# Patient Record
Sex: MALE | Race: WHITE
[De-identification: names, ages, dates, MRNs, and addresses within clinical notes are randomized per-mention and may not be internally consistent; named-entity substitution may affect disease eponyms.]

---

## 2017-06-26 ENCOUNTER — HOSPITAL ENCOUNTER (OUTPATIENT)
Dept: HOSPITAL 45 - C.LABPBG | Age: 42
Discharge: HOME | End: 2017-06-26
Attending: INTERNAL MEDICINE
Payer: COMMERCIAL

## 2017-06-26 DIAGNOSIS — R73.03: ICD-10-CM

## 2017-06-26 DIAGNOSIS — R74.0: Primary | ICD-10-CM

## 2017-06-26 DIAGNOSIS — I10: ICD-10-CM

## 2017-06-26 LAB
ALBUMIN/GLOB SERPL: 1.2 {RATIO} (ref 0.9–2)
ALP SERPL-CCNC: 46 U/L (ref 45–117)
ALT SERPL-CCNC: 96 U/L (ref 12–78)
ANION GAP SERPL CALC-SCNC: 8 MMOL/L (ref 3–11)
AST SERPL-CCNC: 65 U/L (ref 15–37)
BUN SERPL-MCNC: 18 MG/DL (ref 7–18)
BUN/CREAT SERPL: 16 (ref 10–20)
CALCIUM SERPL-MCNC: 10.2 MG/DL (ref 8.5–10.1)
CHLORIDE SERPL-SCNC: 103 MMOL/L (ref 98–107)
CO2 SERPL-SCNC: 30 MMOL/L (ref 21–32)
CREAT SERPL-MCNC: 1.1 MG/DL (ref 0.6–1.4)
EST. AVERAGE GLUCOSE BLD GHB EST-MCNC: 134 MG/DL
FERRITIN SERPL-MCNC: 428.1 NG/ML (ref 8–388)
GLOBULIN SER-MCNC: 3.6 GM/DL (ref 2.5–4)
GLUCOSE SERPL-MCNC: 112 MG/DL (ref 70–99)
POTASSIUM SERPL-SCNC: 3.4 MMOL/L (ref 3.5–5.1)
SODIUM SERPL-SCNC: 141 MMOL/L (ref 136–145)
TIBC SERPL-MCNC: 342 MCG/DL (ref 250–450)

## 2017-07-05 NOTE — CODING QUERY MEDICAL NECESSITY
CQSUPPORTING DIAGNOSIS NEEDED





A supporting diagnosis is required for the test/procedure performed on this patient in 
order for us to be reimbursed by the patient's insurance. Please provide a supporting 
diagnosis for the following test/procedure listed below next to the test name along with 
your signature. 



*If there is no additional diagnosis for this patient that would support the following 
test/procedure please document that below next to the test/procedure.



Test(s)/Procedure(s) that require a supporting diagnosis:



DOS   06/26/17      GLYCATED HEMOGLOBIN TEST







Provider Signature:  ______________________________  Date:  _______



Thank you  

Karen Pineda

Health Information Management

Phone:  [phone]

Fax:  [phone]



Once completed, please kindly fax back to [phone]



For questions please call [phone]

## 2017-07-31 ENCOUNTER — HOSPITAL ENCOUNTER (OUTPATIENT)
Dept: HOSPITAL 45 - C.LABPBG | Age: 42
Discharge: HOME | End: 2017-07-31
Attending: FAMILY MEDICINE
Payer: COMMERCIAL

## 2017-07-31 DIAGNOSIS — E78.1: Primary | ICD-10-CM

## 2017-07-31 LAB
ALP SERPL-CCNC: 34 U/L (ref 45–117)
ALT SERPL-CCNC: 87 U/L (ref 12–78)
AST SERPL-CCNC: 47 U/L (ref 15–37)

## 2017-09-26 ENCOUNTER — HOSPITAL ENCOUNTER (OUTPATIENT)
Dept: HOSPITAL 45 - C.LABPBG | Age: 42
Discharge: HOME | End: 2017-09-26
Attending: FAMILY MEDICINE
Payer: COMMERCIAL

## 2017-09-26 DIAGNOSIS — R73.03: ICD-10-CM

## 2017-09-26 DIAGNOSIS — E78.1: Primary | ICD-10-CM

## 2017-09-26 DIAGNOSIS — R74.8: ICD-10-CM

## 2017-09-26 LAB
ALBUMIN/GLOB SERPL: 1.2 {RATIO} (ref 0.9–2)
ALP SERPL-CCNC: 31 U/L (ref 45–117)
ALT SERPL-CCNC: 56 U/L (ref 12–78)
ANION GAP SERPL CALC-SCNC: 8 MMOL/L (ref 3–11)
AST SERPL-CCNC: 35 U/L (ref 15–37)
BUN SERPL-MCNC: 21 MG/DL (ref 7–18)
BUN/CREAT SERPL: 15.3 (ref 10–20)
CALCIUM SERPL-MCNC: 9.9 MG/DL (ref 8.5–10.1)
CHLORIDE SERPL-SCNC: 102 MMOL/L (ref 98–107)
CHOLEST/HDLC SERPL: 4.1 {RATIO}
CO2 SERPL-SCNC: 29 MMOL/L (ref 21–32)
CREAT SERPL-MCNC: 1.4 MG/DL (ref 0.6–1.4)
EST. AVERAGE GLUCOSE BLD GHB EST-MCNC: 134 MG/DL
GLOBULIN SER-MCNC: 3.6 GM/DL (ref 2.5–4)
GLUCOSE SERPL-MCNC: 93 MG/DL (ref 70–99)
GLUCOSE UR QL: 36 MG/DL
KETONES UR QL STRIP: 89 MG/DL
NITRITE UR QL STRIP: 119 MG/DL (ref 0–150)
PH UR: 149 MG/DL (ref 0–200)
POTASSIUM SERPL-SCNC: 3.7 MMOL/L (ref 3.5–5.1)
SODIUM SERPL-SCNC: 139 MMOL/L (ref 136–145)
VERY LOW DENSITY LIPOPROT CALC: 24 MG/DL

## 2018-03-23 ENCOUNTER — HOSPITAL ENCOUNTER (OUTPATIENT)
Dept: HOSPITAL 45 - C.LABPBG | Age: 43
Discharge: HOME | End: 2018-03-23
Attending: FAMILY MEDICINE
Payer: COMMERCIAL

## 2018-03-23 DIAGNOSIS — Q24.9: Primary | ICD-10-CM

## 2018-03-23 DIAGNOSIS — E78.1: ICD-10-CM

## 2018-03-23 DIAGNOSIS — R74.8: ICD-10-CM

## 2018-03-23 DIAGNOSIS — R73.03: ICD-10-CM

## 2018-03-23 LAB
ALBUMIN SERPL-MCNC: 4.5 GM/DL (ref 3.4–5)
ALP SERPL-CCNC: 29 U/L (ref 45–117)
ALT SERPL-CCNC: 58 U/L (ref 12–78)
AST SERPL-CCNC: 38 U/L (ref 15–37)
BUN SERPL-MCNC: 24 MG/DL (ref 7–18)
CALCIUM SERPL-MCNC: 9.5 MG/DL (ref 8.5–10.1)
CO2 SERPL-SCNC: 28 MMOL/L (ref 21–32)
CREAT SERPL-MCNC: 1.37 MG/DL (ref 0.6–1.4)
GLUCOSE SERPL-MCNC: 102 MG/DL (ref 70–99)
KETONES UR QL STRIP: 96 MG/DL
PH UR: 153 MG/DL (ref 0–200)
POTASSIUM SERPL-SCNC: 3.9 MMOL/L (ref 3.5–5.1)
PROT SERPL-MCNC: 7.9 GM/DL (ref 6.4–8.2)
SODIUM SERPL-SCNC: 138 MMOL/L (ref 136–145)

## 2018-03-24 LAB — HBA1C MFR BLD: 6.3 % (ref 4.5–5.6)
